# Patient Record
Sex: FEMALE | Race: WHITE | NOT HISPANIC OR LATINO
[De-identification: names, ages, dates, MRNs, and addresses within clinical notes are randomized per-mention and may not be internally consistent; named-entity substitution may affect disease eponyms.]

---

## 2018-12-06 ENCOUNTER — RESULT REVIEW (OUTPATIENT)
Age: 53
End: 2018-12-06

## 2019-01-02 ENCOUNTER — APPOINTMENT (OUTPATIENT)
Dept: PLASTIC SURGERY | Facility: CLINIC | Age: 54
End: 2019-01-02

## 2019-03-27 ENCOUNTER — APPOINTMENT (OUTPATIENT)
Dept: UROGYNECOLOGY | Facility: CLINIC | Age: 54
End: 2019-03-27
Payer: COMMERCIAL

## 2019-03-27 VITALS
HEART RATE: 68 BPM | HEIGHT: 64 IN | DIASTOLIC BLOOD PRESSURE: 76 MMHG | BODY MASS INDEX: 25.1 KG/M2 | SYSTOLIC BLOOD PRESSURE: 116 MMHG | WEIGHT: 147 LBS

## 2019-03-27 DIAGNOSIS — Z80.49 FAMILY HISTORY OF MALIGNANT NEOPLASM OF OTHER GENITAL ORGANS: ICD-10-CM

## 2019-03-27 DIAGNOSIS — K46.9 UNSPECIFIED ABDOMINAL HERNIA W/OUT OBSTRUCTION OR GANGRENE: ICD-10-CM

## 2019-03-27 DIAGNOSIS — Z78.9 OTHER SPECIFIED HEALTH STATUS: ICD-10-CM

## 2019-03-27 LAB
BILIRUB UR QL STRIP: NORMAL
CLARITY UR: CLEAR
COLLECTION METHOD: NORMAL
GLUCOSE UR-MCNC: NORMAL
HCG UR QL: 0.2 EU/DL
HGB UR QL STRIP.AUTO: NORMAL
KETONES UR-MCNC: NORMAL
LEUKOCYTE ESTERASE UR QL STRIP: NORMAL
NITRITE UR QL STRIP: NORMAL
PH UR STRIP: 6.5
PROT UR STRIP-MCNC: NORMAL
SP GR UR STRIP: 1.02

## 2019-03-27 PROCEDURE — 51701 INSERT BLADDER CATHETER: CPT

## 2019-03-27 PROCEDURE — 99244 OFF/OP CNSLTJ NEW/EST MOD 40: CPT | Mod: 25

## 2019-03-27 NOTE — PHYSICAL EXAM
[No Acute Distress] : in no acute distress [Well developed] : well developed [Well Nourished] : ~L well nourished [Oriented x3] : oriented to person, place, and time [Bulbocavernous] : bulbocavernous was present [Anal Reflex] : the anal reflex was present [Warm and Dry] : was warm and dry to touch [Normal Gait] : gait was normal [Normal Appearance] : general appearance was normal [Uterine Adnexae] : were not tender and not enlarged [Normal rectal exam] : was normal [Normal] : was normal [1] : 1 [Aa ____] : Aa [unfilled] [Ba ____] : Ba [unfilled] [C ____] : C [unfilled] [GH ____] : GH [unfilled] [PB ____] : PB [unfilled] [TVL ____] : TVL  [unfilled] [Bp ____] : Bp [unfilled] [D ____] : D [unfilled] [Rectocele] : a rectocele [Cystocele] : a cystocele [Uterine Prolapse] : uterine prolapse [] : III [Post Void Residual ____ml] : post void residual via catheterization was [unfilled] ml [Tenderness] : ~T no ~M abdominal tenderness observed [Distended] : not distended [H/Smegaly] : no hepatosplenomegaly [Inguinal LAD] : no adenopathy was noted in the inguinal lymph nodes [FreeTextEntry3] : + hypermobility based on POPQ

## 2019-03-27 NOTE — HISTORY OF PRESENT ILLNESS
[Cystocele (Obstetric)] : daily [Uterine Prolapse] : daily [Vaginal Wall Prolapse] : none [Rectal Prolapse] : daily [Urinary Frequency] : none [Feelings Of Urinary Urgency] : none [Urinary Frequency More Than Twice At Night (Nocturia)] : none [Urinary Tract Infection] : none [Constipation Obstructed Defecation] : none [Pelvic Pain] : none [Vaginal Pain] : none [Rectal Pain] : none [] : months ago [Unable To Restrain Bowel Movement] : none [de-identified] : 2-3 [FreeTextEntry3] : to help with voiding [de-identified] : 1-2 [FreeTextEntry5] : worse in the last year [de-identified] : 2-3 [de-identified] : + episode of spont urine loss 1 year ago [de-identified] : 2-3 [de-identified] : sometimes urine won't come out and needs to reduce prolapse [de-identified] : not currently sexually active (no partner) [FreeTextEntry1] : 52 yo with complaints of vaginal bulge, pressure and splinting which has worsened over the past few years. Reports these symptoms have interfered with her daily activities (is unable to play basketball with her son).  ROS as per questionnaire.\par

## 2019-03-27 NOTE — PROCEDURE
[FreeTextEntry1] : A catheterized urine was performed to rule out urinary tract infection and/or retention \par

## 2019-03-27 NOTE — DISCUSSION/SUMMARY
[FreeTextEntry1] : I reviewed the above findings with the patient. Treatment options for the prolapse were discussed and included doing nothing, Kegel exercises and behavioral modification, a pessary, or surgical correction.Surgically we discussed the abdominal vs the vaginal routes. Abdominally we discussed a hysterectomy and a sacral colpopexy either via laparotomy or laparoscopically and robotically.  Vaginally we discussed a vaginal hysterectomy, uterosacral suspension, and anterior repair versus vaginal mesh reconstruction. We discussed the FDA warning on transvaginal mesh. Surgically we discussed hysteropexy as well as the use of biologics. Pt interested in surgical correction and I have asked her to return for urodynamic testing to further evaluate her urinary complaints. We'll further discuss treatment options after her evaluation is completed. I UGA patient information on pelvic organ prolapse and sacral colpopexy was given to her. All questions were answered.\par

## 2019-03-28 ENCOUNTER — RESULT REVIEW (OUTPATIENT)
Age: 54
End: 2019-03-28

## 2019-03-28 LAB
APPEARANCE: CLEAR
BACTERIA: NEGATIVE
BILIRUBIN URINE: NEGATIVE
BLOOD URINE: NEGATIVE
COLOR: NORMAL
GLUCOSE QUALITATIVE U: NEGATIVE
HYALINE CASTS: 0 /LPF
KETONES URINE: NEGATIVE
LEUKOCYTE ESTERASE URINE: NEGATIVE
MICROSCOPIC-UA: NORMAL
NITRITE URINE: NEGATIVE
PH URINE: 6.5
PROTEIN URINE: NEGATIVE
RED BLOOD CELLS URINE: 1 /HPF
SPECIFIC GRAVITY URINE: 1.02
SQUAMOUS EPITHELIAL CELLS: 0 /HPF
UROBILINOGEN URINE: NORMAL
WHITE BLOOD CELLS URINE: 0 /HPF

## 2019-03-29 ENCOUNTER — RESULT REVIEW (OUTPATIENT)
Age: 54
End: 2019-03-29

## 2019-03-29 LAB — BACTERIA UR CULT: NORMAL

## 2019-04-26 ENCOUNTER — APPOINTMENT (OUTPATIENT)
Dept: OTOLARYNGOLOGY | Facility: CLINIC | Age: 54
End: 2019-04-26
Payer: COMMERCIAL

## 2019-04-26 VITALS
HEART RATE: 71 BPM | WEIGHT: 147 LBS | SYSTOLIC BLOOD PRESSURE: 108 MMHG | BODY MASS INDEX: 25.1 KG/M2 | DIASTOLIC BLOOD PRESSURE: 71 MMHG | HEIGHT: 64 IN

## 2019-04-26 DIAGNOSIS — Z80.1 FAMILY HISTORY OF MALIGNANT NEOPLASM OF TRACHEA, BRONCHUS AND LUNG: ICD-10-CM

## 2019-04-26 DIAGNOSIS — J34.2 DEVIATED NASAL SEPTUM: ICD-10-CM

## 2019-04-26 DIAGNOSIS — S02.2XXA FRACTURE OF NASAL BONES, INITIAL ENCOUNTER FOR CLOSED FRACTURE: ICD-10-CM

## 2019-04-26 DIAGNOSIS — Z86.19 PERSONAL HISTORY OF OTHER INFECTIOUS AND PARASITIC DISEASES: ICD-10-CM

## 2019-04-26 DIAGNOSIS — Z82.49 FAMILY HISTORY OF ISCHEMIC HEART DISEASE AND OTHER DISEASES OF THE CIRCULATORY SYSTEM: ICD-10-CM

## 2019-04-26 PROCEDURE — 31231 NASAL ENDOSCOPY DX: CPT

## 2019-04-26 PROCEDURE — 99204 OFFICE O/P NEW MOD 45 MIN: CPT | Mod: 25

## 2019-04-26 RX ORDER — LORATADINE 5 MG/5 ML
SOLUTION, ORAL ORAL
Refills: 0 | Status: ACTIVE | COMMUNITY

## 2019-04-26 NOTE — PROCEDURE
[Image(s) Captured] : image(s) captured and filed [Topical Lidocaine] : topical lidocaine [Oxymetazoline HCl] : oxymetazoline HCl [Serial Number: ___] : Serial Number: [unfilled] [Rigid Endoscope] : examined with a rigid endoscope [FreeTextEntry6] : Pre-op indication(s): Nasal fracture , deviated septum\par Post-op indication(s): same\par Verbal consent obtained from patient.\par “Anterior rhinoscopy insufficient to account for symptoms” \par Details for procedure: \par Scope #: `119\par Type of scope:    flexible fiber optic telescope  X   Rigid glass telescope \par Anesthesia and/or vasoconstriction was achieved topically by using: \par 4% Lidocaine spray   0.05% Oxymetazoline     Other ______ \par The following anatomic sites were directly examined in a sequential fashion: \par The scope was introduced in the nasal passage between the middle and inferior turbinates to exam the inferior portion of the middle meatus and the fontanelle, as well as the maxillary ostia. Next, the scope was passed medially and posteriorly to the middle turbinates to examine the sphenoethmoid recess and the superior turbinate region. \par Upon visualization the finders are as follows: \par Nasal Septum:     Deviated to    right  \par Bleeding site cauterized:    Anterior   left   right   Posterior   left   right \par Method:   Silver Nitrate   YAG Laser    Electrocautery ______ \par Right Side: \par * Mucosa: Normal\par * Mucous: Normal\par * Polyp: Normal\par * Inferior Turbinate: Normal\par * Middle Turbinate: Normal\par * Superior Turbinate: Normal\par * Inferior Meatus: Normal\par * Middle Meatus: Normal\par * Super Meatus: Normal\par * Sphenoethmoidal Recess: Normal\par Left Side: \par * Mucosa: Normal\par * Mucous: Normal\par * Polyp: Normal\par * Inferior Turbinate: Normal\par * Middle Turbinate: Normal\par * Superior Turbinate: Normal\par * Inferior Meatus: Normal\par * Middle Meatus: Normal\par * Super Meatus: Normal\par * Sphenoethmoidal Recess: Normal\par The patient tolerated the procedure well without any complications.\par \par \par

## 2019-04-26 NOTE — HISTORY OF PRESENT ILLNESS
[de-identified] : 53 year old female here for fractured nose.  STates fractured nose from a fall 12/19/18.  States xray showed fracture.  States having difficulty breathing through the right side of the nose.   Closed reduction done in [place] by Jared Washington MD

## 2019-04-26 NOTE — PHYSICAL EXAM
[Nasal Endoscopy Performed] : nasal endoscopy was performed, see procedure section for findings [] : septum deviated to the right [Midline] : trachea located in midline position [Normal] : no rashes [de-identified] : Depressed left nasal fractire [de-identified] : swollen

## 2019-04-26 NOTE — REASON FOR VISIT
[Initial Evaluation] : an initial evaluation for [Other: _____] : [unfilled] [FreeTextEntry2] : here for fractured nose

## 2019-06-07 ENCOUNTER — APPOINTMENT (OUTPATIENT)
Dept: OTOLARYNGOLOGY | Facility: CLINIC | Age: 54
End: 2019-06-07

## 2019-06-17 ENCOUNTER — APPOINTMENT (OUTPATIENT)
Dept: OTOLARYNGOLOGY | Facility: CLINIC | Age: 54
End: 2019-06-17

## 2019-06-18 ENCOUNTER — APPOINTMENT (OUTPATIENT)
Dept: OTOLARYNGOLOGY | Facility: CLINIC | Age: 54
End: 2019-06-18

## 2019-11-20 ENCOUNTER — APPOINTMENT (OUTPATIENT)
Dept: OTOLARYNGOLOGY | Facility: CLINIC | Age: 54
End: 2019-11-20

## 2019-11-26 ENCOUNTER — APPOINTMENT (OUTPATIENT)
Dept: OTOLARYNGOLOGY | Facility: HOSPITAL | Age: 54
End: 2019-11-26

## 2019-11-27 ENCOUNTER — APPOINTMENT (OUTPATIENT)
Dept: OTOLARYNGOLOGY | Facility: CLINIC | Age: 54
End: 2019-11-27

## 2020-01-03 ENCOUNTER — APPOINTMENT (OUTPATIENT)
Dept: UROGYNECOLOGY | Facility: CLINIC | Age: 55
End: 2020-01-03
Payer: COMMERCIAL

## 2020-01-03 ENCOUNTER — RESULT CHARGE (OUTPATIENT)
Age: 55
End: 2020-01-03

## 2020-01-03 ENCOUNTER — OUTPATIENT (OUTPATIENT)
Dept: OUTPATIENT SERVICES | Facility: HOSPITAL | Age: 55
LOS: 1 days | End: 2020-01-03
Payer: COMMERCIAL

## 2020-01-03 DIAGNOSIS — R35.0 FREQUENCY OF MICTURITION: ICD-10-CM

## 2020-01-03 DIAGNOSIS — Z01.818 ENCOUNTER FOR OTHER PREPROCEDURAL EXAMINATION: ICD-10-CM

## 2020-01-03 LAB
BILIRUB UR QL STRIP: NORMAL
CLARITY UR: CLEAR
COLLECTION METHOD: NORMAL
GLUCOSE UR-MCNC: NORMAL
HCG UR QL: 0.2 EU/DL
HGB UR QL STRIP.AUTO: NORMAL
KETONES UR-MCNC: NORMAL
LEUKOCYTE ESTERASE UR QL STRIP: NORMAL
NITRITE UR QL STRIP: NORMAL
PH UR STRIP: 6
PROT UR STRIP-MCNC: NORMAL
SP GR UR STRIP: 1

## 2020-01-03 PROCEDURE — 51784 ANAL/URINARY MUSCLE STUDY: CPT | Mod: 26

## 2020-01-03 PROCEDURE — 51729 CYSTOMETROGRAM W/VP&UP: CPT | Mod: 26

## 2020-01-03 PROCEDURE — 51784 ANAL/URINARY MUSCLE STUDY: CPT

## 2020-01-03 PROCEDURE — 51797 INTRAABDOMINAL PRESSURE TEST: CPT | Mod: 26

## 2020-01-03 PROCEDURE — 51729 CYSTOMETROGRAM W/VP&UP: CPT

## 2020-01-03 PROCEDURE — 51797 INTRAABDOMINAL PRESSURE TEST: CPT

## 2020-01-27 ENCOUNTER — APPOINTMENT (OUTPATIENT)
Dept: UROGYNECOLOGY | Facility: CLINIC | Age: 55
End: 2020-01-27
Payer: COMMERCIAL

## 2020-01-27 ENCOUNTER — APPOINTMENT (OUTPATIENT)
Dept: UROGYNECOLOGY | Facility: CLINIC | Age: 55
End: 2020-01-27

## 2020-01-27 DIAGNOSIS — N36.41 HYPERMOBILITY OF URETHRA: ICD-10-CM

## 2020-01-27 PROCEDURE — 99214 OFFICE O/P EST MOD 30 MIN: CPT

## 2020-01-27 NOTE — DISCUSSION/SUMMARY
[FreeTextEntry1] : I reviewed the above findings with the patient with visual illustrations. Treatment options for the prolapse were discussed and included doing nothing, Kegel exercises and behavioral modification, a pessary, or surgical correction.Surgically we discussed the abdominal vs the vaginal routes. Abdominally we discussed a hysterectomy and a sacral colpopexy   laparoscopically and robotically.  Vaginally we discussed a vaginal hysterectomy, uterosacral suspension, and anterior/posterior repair. Risks and benefits discussed.She wishes to proceed with the lap/oleg route with hysterectomy and sacral colpopexy.  We discussed that urodynamic testing did not show DORETHA and the possibility of developing DORETHA postop.  We discussed risks and benefits of sling at time of surgery and we will not proceed with such. We discussed that post-op if she develops DORETHA she can request tx as well as surgical management.  We discussed the possibly of going home with a catheter.  All questions were answered. IUGA pt info on SCP given\par \par

## 2020-01-27 NOTE — PHYSICAL EXAM
[Aa ____] : Aa [unfilled] [Ba ____] : Ba [unfilled] [C ____] : C [unfilled] [GH ____] : GH [unfilled] [TVL ____] : TVL  [unfilled] [Ap ____] : Ap [unfilled] [Bp ____] : Bp [unfilled] [D ____] : D [unfilled] [Uterine Adnexae] : were not tender and not enlarged [No Acute Distress] : in no acute distress [Well developed] : well developed [Well Nourished] : ~L well nourished [Oriented x3] : oriented to person, place, and time [Soft, Nontender] : the abdomen was soft and nontender [Warm and Dry] : was warm and dry to touch [Normal] : normal external genitalia [Rectocele] : a rectocele [Cystocele] : a cystocele [Uterine Prolapse] : uterine prolapse [FreeTextEntry3] : + hypoermobility

## 2020-01-27 NOTE — HISTORY OF PRESENT ILLNESS
[Uterine Prolapse] : daily [Vaginal Wall Prolapse] : none [Rectal Prolapse] : none [FreeTextEntry1] : pt here for f/u on POP she is interested in surgical correction.

## 2020-03-13 ENCOUNTER — APPOINTMENT (OUTPATIENT)
Dept: OTOLARYNGOLOGY | Facility: CLINIC | Age: 55
End: 2020-03-13

## 2020-04-07 ENCOUNTER — APPOINTMENT (OUTPATIENT)
Dept: OTOLARYNGOLOGY | Facility: HOSPITAL | Age: 55
End: 2020-04-07

## 2020-04-08 ENCOUNTER — APPOINTMENT (OUTPATIENT)
Dept: OTOLARYNGOLOGY | Facility: CLINIC | Age: 55
End: 2020-04-08

## 2020-06-03 ENCOUNTER — APPOINTMENT (OUTPATIENT)
Dept: UROGYNECOLOGY | Facility: CLINIC | Age: 55
End: 2020-06-03

## 2020-10-07 ENCOUNTER — RESULT REVIEW (OUTPATIENT)
Age: 55
End: 2020-10-07

## 2020-11-23 ENCOUNTER — APPOINTMENT (OUTPATIENT)
Dept: UROGYNECOLOGY | Facility: CLINIC | Age: 55
End: 2020-11-23
Payer: COMMERCIAL

## 2020-11-23 VITALS
TEMPERATURE: 97.1 F | SYSTOLIC BLOOD PRESSURE: 112 MMHG | BODY MASS INDEX: 25.61 KG/M2 | WEIGHT: 150 LBS | HEIGHT: 64 IN | DIASTOLIC BLOOD PRESSURE: 78 MMHG

## 2020-11-23 PROCEDURE — 99214 OFFICE O/P EST MOD 30 MIN: CPT

## 2020-11-23 PROCEDURE — 81003 URINALYSIS AUTO W/O SCOPE: CPT | Mod: QW

## 2020-11-23 NOTE — HISTORY OF PRESENT ILLNESS
[Uterine Prolapse] : severe [Vaginal Wall Prolapse] : no [] : years ago [Rectal Prolapse] : no [Unable To Restrain Bowel Movement] : no [Feelings Of Urinary Urgency] : no [Urinary Tract Infection] : no [Constipation Obstructed Defecation] : no [de-identified] : 3 [de-identified] : 2 [de-identified] : 3 [FreeTextEntry1] : Virginia is a 55 yo with stage prolapse presenting for follow up.  Patient has had no change in symptoms since her initial visit and continues to desire surgery.  Her preoperative work up is as follows:\par \par TVUS 10/22/20: uterus 4.3 x 1.9 x. 3.1 cm, EM 0.1cm, left ovary 1.7 x 1.1 x 1.3, right ovary not visualized. \par Urodynamics 20:  no leak at any volume, no DO, PVR 0cc\par \par PMH: , Lyme disease with recent deer tick bite folllowing up with her PCP to discuss with PCP at presurgical clearance, periumbilical hernia\par PSH: None

## 2020-11-23 NOTE — PHYSICAL EXAM
[Chaperone Present] : A chaperone was present in the examining room during all aspects of the physical examination [No Acute Distress] : in no acute distress [Well developed] : well developed [Well Nourished] : ~L well nourished [Oriented x3] : oriented to person, place, and time [Soft, Nontender] : the abdomen was soft and nontender [Warm and Dry] : was warm and dry to touch [Rectocele] : a rectocele [Cystocele] : a cystocele [Uterine Prolapse] : uterine prolapse [Aa ____] : Aa [unfilled] [Ba ____] : Ba [unfilled] [C ____] : C [unfilled] [GH ____] : GH [unfilled] [TVL ____] : TVL  [unfilled] [Ap ____] : Ap [unfilled] [Bp ____] : Bp [unfilled] [D ____] : D [unfilled] [Normal] : normal [Uterine Adnexae] : were not tender and not enlarged [PB ____] : PB [unfilled] [FreeTextEntry3] : + hypoermobility

## 2020-11-23 NOTE — DISCUSSION/SUMMARY
[FreeTextEntry1] : Virginia is a 55 yo with stage III POP presenting for presurgical counseling. I reviewed the above findings with the patient as well surgical and nonsurgical treatment options for the prolapse  and stress incontinence and she wishes to proceed with surgical correction via laparoscopic/robotic hysterectomy and sacral colpopexy for the prolapse . Risks and benefits of a supracervical hysterectomy versus total were discussed and she wishes to proceed with a supracervical hysterectomy. Risks and benefits of a BSO were discussed and she wishes to proceed with a bilateral salpingectomy, and oophorectomy only if ovaries are found to be abnormal at the time of surgery. We discussed the possibility of a laparotomy at the time of surgical correction. We discussed the results of her urodynamic testing and risk of potential undetected stress urinary incontinence, given no occult DORETHA found patient declines sling however understands there is still some risk of new DORETHA or OAB following surgery.  Risks and benefits of the procedure were discussed and included but not limited to bleeding, infection, failure, erosion, dyspareunia, damage to other organs including bladder bowel and ureters, developing chronic pelvic pain, and urinary retention. We discussed the possibility of going home with a catheter. Hospital stay, postoperative restrictions, and anesthesia were discussed. All questions were answered and she wishes to proceed with surgical correction. Consent was signed in the office.\par

## 2020-12-01 ENCOUNTER — OUTPATIENT (OUTPATIENT)
Dept: OUTPATIENT SERVICES | Facility: HOSPITAL | Age: 55
LOS: 1 days | End: 2020-12-01
Payer: COMMERCIAL

## 2020-12-01 VITALS
OXYGEN SATURATION: 98 % | SYSTOLIC BLOOD PRESSURE: 110 MMHG | HEIGHT: 64.3 IN | DIASTOLIC BLOOD PRESSURE: 70 MMHG | HEART RATE: 77 BPM | TEMPERATURE: 98 F | RESPIRATION RATE: 16 BRPM | WEIGHT: 149.91 LBS

## 2020-12-01 DIAGNOSIS — N81.11 CYSTOCELE, MIDLINE: ICD-10-CM

## 2020-12-01 DIAGNOSIS — Z01.818 ENCOUNTER FOR OTHER PREPROCEDURAL EXAMINATION: ICD-10-CM

## 2020-12-01 DIAGNOSIS — Z90.89 ACQUIRED ABSENCE OF OTHER ORGANS: Chronic | ICD-10-CM

## 2020-12-01 DIAGNOSIS — Z98.890 OTHER SPECIFIED POSTPROCEDURAL STATES: Chronic | ICD-10-CM

## 2020-12-01 DIAGNOSIS — Z29.9 ENCOUNTER FOR PROPHYLACTIC MEASURES, UNSPECIFIED: ICD-10-CM

## 2020-12-01 DIAGNOSIS — N81.4 UTEROVAGINAL PROLAPSE, UNSPECIFIED: ICD-10-CM

## 2020-12-01 DIAGNOSIS — N81.3 COMPLETE UTEROVAGINAL PROLAPSE: ICD-10-CM

## 2020-12-01 LAB
A1C WITH ESTIMATED AVERAGE GLUCOSE RESULT: 5.4 % — SIGNIFICANT CHANGE UP (ref 4–5.6)
ANION GAP SERPL CALC-SCNC: 11 MMOL/L — SIGNIFICANT CHANGE UP (ref 5–17)
BLD GP AB SCN SERPL QL: NEGATIVE — SIGNIFICANT CHANGE UP
BUN SERPL-MCNC: 16 MG/DL — SIGNIFICANT CHANGE UP (ref 7–23)
CALCIUM SERPL-MCNC: 9.2 MG/DL — SIGNIFICANT CHANGE UP (ref 8.4–10.5)
CHLORIDE SERPL-SCNC: 101 MMOL/L — SIGNIFICANT CHANGE UP (ref 96–108)
CO2 SERPL-SCNC: 24 MMOL/L — SIGNIFICANT CHANGE UP (ref 22–31)
CREAT SERPL-MCNC: 0.49 MG/DL — LOW (ref 0.5–1.3)
ESTIMATED AVERAGE GLUCOSE: 108 MG/DL — SIGNIFICANT CHANGE UP (ref 68–114)
GLUCOSE SERPL-MCNC: 94 MG/DL — SIGNIFICANT CHANGE UP (ref 70–99)
POTASSIUM SERPL-MCNC: 3.9 MMOL/L — SIGNIFICANT CHANGE UP (ref 3.5–5.3)
POTASSIUM SERPL-SCNC: 3.9 MMOL/L — SIGNIFICANT CHANGE UP (ref 3.5–5.3)
RH IG SCN BLD-IMP: POSITIVE — SIGNIFICANT CHANGE UP
SODIUM SERPL-SCNC: 136 MMOL/L — SIGNIFICANT CHANGE UP (ref 135–145)

## 2020-12-01 PROCEDURE — 86850 RBC ANTIBODY SCREEN: CPT

## 2020-12-01 PROCEDURE — 80048 BASIC METABOLIC PNL TOTAL CA: CPT

## 2020-12-01 PROCEDURE — 86900 BLOOD TYPING SEROLOGIC ABO: CPT

## 2020-12-01 PROCEDURE — 83036 HEMOGLOBIN GLYCOSYLATED A1C: CPT

## 2020-12-01 PROCEDURE — G0463: CPT

## 2020-12-01 PROCEDURE — 86901 BLOOD TYPING SEROLOGIC RH(D): CPT

## 2020-12-01 PROCEDURE — 85027 COMPLETE CBC AUTOMATED: CPT

## 2020-12-01 RX ORDER — SODIUM CHLORIDE 9 MG/ML
3 INJECTION INTRAMUSCULAR; INTRAVENOUS; SUBCUTANEOUS EVERY 8 HOURS
Refills: 0 | Status: DISCONTINUED | OUTPATIENT
Start: 2020-12-15 | End: 2020-12-15

## 2020-12-01 RX ORDER — CEFOTETAN DISODIUM 1 G
2 VIAL (EA) INJECTION ONCE
Refills: 0 | Status: COMPLETED | OUTPATIENT
Start: 2020-12-15 | End: 2020-12-15

## 2020-12-01 NOTE — H&P PST ADULT - NSICDXPROBLEM_GEN_ALL_CORE_FT
PROBLEM DIAGNOSES  Problem: Uterovaginal prolapse  Assessment and Plan: cystoscopy, laparoscopic robotic supracervical hysterectomy, laparoscopic robotic sacral colpopexy on 12/15/2020.   PST instructions provided, surgical scrub given, patient verbalized understanding.   CBc,BMP,HgA1c, T&S collected and sent   Urine cx 11/23 @ [facility]- negative   [facility]  [facility]CG, FS on admission        PROBLEM DIAGNOSES  Problem: Uterovaginal prolapse  Assessment and Plan: cystoscopy, laparoscopic robotic supracervical hysterectomy, laparoscopic robotic sacral colpopexy on 12/15/2020.   PST instructions provided, surgical scrub given, patient verbalized understanding.   CBc,BMP,HgA1c, T&S collected and sent   Urine cx 11/23 @ [facility]- negative   Covid PCR 12/12/20 @ [facility]  [facility], FS on admission     Problem: Need for prophylactic measure  Assessment and Plan: The Caprini score indicates this patient is at risk for a VTE event (score 3-5).  Most surgical patients in this group would benefit from pharmacologic prophylaxis.  The surgical team will determine the balance between VTE risk and bleeding risk

## 2020-12-01 NOTE — H&P PST ADULT - ASSESSMENT
KIMBERLYI VTE 2.0 SCORE [CLOT updated 2019]    AGE RELATED RISK FACTORS                                                       MOBILITY RELATED FACTORS  [x ] Age 41-60 years                                            (1 Point)                    [ ] Bed rest                                                        (1 Point)  [ ] Age: 61-74 years                                           (2 Points)                  [ ] Plaster cast                                                   (2 Points)  [ ] Age= 75 years                                              (3 Points)                    [ ] Bed bound for more than 72 hours                 (2 Points)    DISEASE RELATED RISK FACTORS                                               GENDER SPECIFIC FACTORS  [ ] Edema in the lower extremities                       (1 Point)              [ ] Pregnancy                                                     (1 Point)  [ ] Varicose veins                                               (1 Point)                     [ ] Post-partum < 6 weeks                                   (1 Point)             [x ] BMI > 25 Kg/m2                                            (1 Point)                     [ ] Hormonal therapy  or oral contraception          (1 Point)                 [ ] Sepsis (in the previous month)                        (1 Point)               [ ] History of pregnancy complications                 (1 point)  [ ] Pneumonia or serious lung disease                                               [ ] Unexplained or recurrent                     (1 Point)           (in the previous month)                               (1 Point)  [ ] Abnormal pulmonary function test                     (1 Point)                 SURGERY RELATED RISK FACTORS  [ ] Acute myocardial infarction                              (1 Point)               [ ]  Section                                             (1 Point)  [ ] Congestive heart failure (in the previous month)  (1 Point)      [ ] Minor surgery                                                  (1 Point)   [ ] Inflammatory bowel disease                             (1 Point)               [ ] Arthroscopic surgery                                        (2 Points)  [ ] Central venous access                                      (2 Points)                [x] General surgery lasting more than 45 minutes (2 points)  [ ] Malignancy- Present or previous                   (2 Points)                [ ] Elective arthroplasty                                         (5 points)    [ ] Stroke (in the previous month)                          (5 Points)                                                                                                                                                           HEMATOLOGY RELATED FACTORS                                                 TRAUMA RELATED RISK FACTORS  [ ] Prior episodes of VTE                                     (3 Points)                [ ] Fracture of the hip, pelvis, or leg                       (5 Points)  [ ] Positive family history for VTE                         (3 Points)             [ ] Acute spinal cord injury (in the previous month)  (5 Points)  [ ] Prothrombin 24795 A                                     (3 Points)               [ ] Paralysis  (less than 1 month)                             (5 Points)  [ ] Factor V Leiden                                             (3 Points)                  [ ] Multiple Trauma within 1 month                        (5 Points)  [ ] Lupus anticoagulants                                     (3 Points)                                                           [ ] Anticardiolipin antibodies                               (3 Points)                                                       [ ] High homocysteine in the blood                      (3 Points)                                             [ ] Other congenital or acquired thrombophilia      (3 Points)                                                [ ] Heparin induced thrombocytopenia                  (3 Points)                                     Total Score [    4      ] KIMBERLYI VTE 2.0 SCORE [CLOT updated 2019]    AGE RELATED RISK FACTORS                                                       MOBILITY RELATED FACTORS  [x ] Age 41-60 years                                            (1 Point)                    [ ] Bed rest                                                        (1 Point)  [ ] Age: 61-74 years                                           (2 Points)                  [ ] Plaster cast                                                   (2 Points)  [ ] Age= 75 years                                              (3 Points)                    [ ] Bed bound for more than 72 hours                 (2 Points)    DISEASE RELATED RISK FACTORS                                               GENDER SPECIFIC FACTORS  [ ] Edema in the lower extremities                       (1 Point)              [ ] Pregnancy                                                     (1 Point)  [ x] Varicose veins                                               (1 Point)                     [ ] Post-partum < 6 weeks                                   (1 Point)             [x ] BMI > 25 Kg/m2                                            (1 Point)                     [ ] Hormonal therapy  or oral contraception          (1 Point)                 [ ] Sepsis (in the previous month)                        (1 Point)               [ ] History of pregnancy complications                 (1 point)  [ ] Pneumonia or serious lung disease                                               [ ] Unexplained or recurrent                     (1 Point)           (in the previous month)                               (1 Point)  [ ] Abnormal pulmonary function test                     (1 Point)                 SURGERY RELATED RISK FACTORS  [ ] Acute myocardial infarction                              (1 Point)               [ ]  Section                                             (1 Point)  [ ] Congestive heart failure (in the previous month)  (1 Point)      [ ] Minor surgery                                                  (1 Point)   [ ] Inflammatory bowel disease                             (1 Point)               [ ] Arthroscopic surgery                                        (2 Points)  [ ] Central venous access                                      (2 Points)                [x] General surgery lasting more than 45 minutes (2 points)  [ ] Malignancy- Present or previous                   (2 Points)                [ ] Elective arthroplasty                                         (5 points)    [ ] Stroke (in the previous month)                          (5 Points)                                                                                                                                                           HEMATOLOGY RELATED FACTORS                                                 TRAUMA RELATED RISK FACTORS  [ ] Prior episodes of VTE                                     (3 Points)                [ ] Fracture of the hip, pelvis, or leg                       (5 Points)  [ ] Positive family history for VTE                         (3 Points)             [ ] Acute spinal cord injury (in the previous month)  (5 Points)  [ ] Prothrombin 35645 A                                     (3 Points)               [ ] Paralysis  (less than 1 month)                             (5 Points)  [ ] Factor V Leiden                                             (3 Points)                  [ ] Multiple Trauma within 1 month                        (5 Points)  [ ] Lupus anticoagulants                                     (3 Points)                                                           [ ] Anticardiolipin antibodies                               (3 Points)                                                       [ ] High homocysteine in the blood                      (3 Points)                                             [ ] Other congenital or acquired thrombophilia      (3 Points)                                                [ ] Heparin induced thrombocytopenia                  (3 Points)                                     Total Score [    5      ]

## 2020-12-01 NOTE — H&P PST ADULT - HISTORY OF PRESENT ILLNESS
54 yr old female with uterovaginal prolapse, midline cystocele, presents to PST for scheduled cystoscopy, laparoscopic robotic supracervical hysterectomy, laparoscopic robotic sacral colpopexy on 12/15/2020. Denies fever, chills, no acute complaints. Denies sick contacts.   Covid PCr 12/12 @ [facility] .

## 2020-12-01 NOTE — H&P PST ADULT - NSICDXPASTMEDICALHX_GEN_ALL_CORE_FT
PAST MEDICAL HISTORY:  Complete uterovaginal prolapse     Cystocele, midline     History of abdominal hernia mild    History of anxiety     History of Lyme disease     History of reduction of nasal fracture closed 2018     PAST MEDICAL HISTORY:  Cystocele, midline     History of abdominal hernia mild    History of anxiety     History of Lyme disease     History of reduction of nasal fracture closed 2018    History of varicose veins     Tick bite -recent bite , unclear if infected, started preventative doxycycline 2 weeks ago  ( about 11/15 /2020 ) .    Uterovaginal prolapse
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
## 2020-12-09 PROBLEM — Z86.79 PERSONAL HISTORY OF OTHER DISEASES OF THE CIRCULATORY SYSTEM: Chronic | Status: ACTIVE | Noted: 2020-12-01

## 2020-12-09 PROBLEM — Z98.890 OTHER SPECIFIED POSTPROCEDURAL STATES: Chronic | Status: ACTIVE | Noted: 2020-12-01

## 2020-12-09 PROBLEM — Z86.19 PERSONAL HISTORY OF OTHER INFECTIOUS AND PARASITIC DISEASES: Chronic | Status: ACTIVE | Noted: 2020-12-01

## 2020-12-09 PROBLEM — N81.4 UTEROVAGINAL PROLAPSE, UNSPECIFIED: Chronic | Status: ACTIVE | Noted: 2020-12-01

## 2020-12-09 PROBLEM — Z87.19 PERSONAL HISTORY OF OTHER DISEASES OF THE DIGESTIVE SYSTEM: Chronic | Status: ACTIVE | Noted: 2020-12-01

## 2020-12-09 PROBLEM — Z86.59 PERSONAL HISTORY OF OTHER MENTAL AND BEHAVIORAL DISORDERS: Chronic | Status: ACTIVE | Noted: 2020-12-01

## 2020-12-09 PROBLEM — N81.11 CYSTOCELE, MIDLINE: Chronic | Status: ACTIVE | Noted: 2020-12-01

## 2020-12-12 ENCOUNTER — OUTPATIENT (OUTPATIENT)
Dept: OUTPATIENT SERVICES | Facility: HOSPITAL | Age: 55
LOS: 1 days | End: 2020-12-12
Payer: COMMERCIAL

## 2020-12-12 DIAGNOSIS — Z98.890 OTHER SPECIFIED POSTPROCEDURAL STATES: Chronic | ICD-10-CM

## 2020-12-12 DIAGNOSIS — Z90.89 ACQUIRED ABSENCE OF OTHER ORGANS: Chronic | ICD-10-CM

## 2020-12-12 DIAGNOSIS — Z11.59 ENCOUNTER FOR SCREENING FOR OTHER VIRAL DISEASES: ICD-10-CM

## 2020-12-12 LAB — SARS-COV-2 RNA SPEC QL NAA+PROBE: SIGNIFICANT CHANGE UP

## 2020-12-12 PROCEDURE — U0003: CPT

## 2020-12-14 ENCOUNTER — TRANSCRIPTION ENCOUNTER (OUTPATIENT)
Age: 55
End: 2020-12-14

## 2020-12-14 ENCOUNTER — NON-APPOINTMENT (OUTPATIENT)
Age: 55
End: 2020-12-14

## 2020-12-15 ENCOUNTER — INPATIENT (INPATIENT)
Facility: HOSPITAL | Age: 55
LOS: 0 days | Discharge: HOME | DRG: 743 | End: 2020-12-16
Attending: UROLOGY | Admitting: UROLOGY
Payer: COMMERCIAL

## 2020-12-15 ENCOUNTER — APPOINTMENT (OUTPATIENT)
Dept: UROGYNECOLOGY | Facility: HOSPITAL | Age: 55
End: 2020-12-15
Payer: COMMERCIAL

## 2020-12-15 VITALS
OXYGEN SATURATION: 100 % | RESPIRATION RATE: 16 BRPM | DIASTOLIC BLOOD PRESSURE: 74 MMHG | HEIGHT: 64 IN | SYSTOLIC BLOOD PRESSURE: 129 MMHG | TEMPERATURE: 98 F | HEART RATE: 84 BPM | WEIGHT: 149.91 LBS

## 2020-12-15 DIAGNOSIS — Z98.890 OTHER SPECIFIED POSTPROCEDURAL STATES: Chronic | ICD-10-CM

## 2020-12-15 DIAGNOSIS — N81.11 CYSTOCELE, MIDLINE: ICD-10-CM

## 2020-12-15 DIAGNOSIS — Z90.89 ACQUIRED ABSENCE OF OTHER ORGANS: Chronic | ICD-10-CM

## 2020-12-15 DIAGNOSIS — N81.3 COMPLETE UTEROVAGINAL PROLAPSE: ICD-10-CM

## 2020-12-15 LAB
GLUCOSE BLDC GLUCOMTR-MCNC: 97 MG/DL — SIGNIFICANT CHANGE UP (ref 70–99)
HCG UR QL: NEGATIVE — SIGNIFICANT CHANGE UP
RH IG SCN BLD-IMP: POSITIVE — SIGNIFICANT CHANGE UP

## 2020-12-15 PROCEDURE — 58542 LSH W/T/O UT 250 G OR LESS: CPT

## 2020-12-15 PROCEDURE — 57425 LAPAROSCOPY SURG COLPOPEXY: CPT

## 2020-12-15 PROCEDURE — 88305 TISSUE EXAM BY PATHOLOGIST: CPT | Mod: 26

## 2020-12-15 RX ORDER — ONDANSETRON 8 MG/1
8 TABLET, FILM COATED ORAL EVERY 8 HOURS
Refills: 0 | Status: DISCONTINUED | OUTPATIENT
Start: 2020-12-15 | End: 2020-12-16

## 2020-12-15 RX ORDER — HYDROMORPHONE HYDROCHLORIDE 2 MG/ML
0.5 INJECTION INTRAMUSCULAR; INTRAVENOUS; SUBCUTANEOUS
Refills: 0 | Status: DISCONTINUED | OUTPATIENT
Start: 2020-12-15 | End: 2020-12-15

## 2020-12-15 RX ORDER — ONDANSETRON 8 MG/1
4 TABLET, FILM COATED ORAL ONCE
Refills: 0 | Status: DISCONTINUED | OUTPATIENT
Start: 2020-12-15 | End: 2020-12-15

## 2020-12-15 RX ORDER — KETOROLAC TROMETHAMINE 30 MG/ML
30 SYRINGE (ML) INJECTION EVERY 6 HOURS
Refills: 0 | Status: DISCONTINUED | OUTPATIENT
Start: 2020-12-15 | End: 2020-12-16

## 2020-12-15 RX ORDER — SODIUM CHLORIDE 9 MG/ML
1000 INJECTION, SOLUTION INTRAVENOUS
Refills: 0 | Status: DISCONTINUED | OUTPATIENT
Start: 2020-12-15 | End: 2020-12-15

## 2020-12-15 RX ORDER — SODIUM CHLORIDE 9 MG/ML
1000 INJECTION, SOLUTION INTRAVENOUS
Refills: 0 | Status: DISCONTINUED | OUTPATIENT
Start: 2020-12-15 | End: 2020-12-16

## 2020-12-15 RX ORDER — SIMETHICONE 80 MG/1
80 TABLET, CHEWABLE ORAL EVERY 6 HOURS
Refills: 0 | Status: DISCONTINUED | OUTPATIENT
Start: 2020-12-15 | End: 2020-12-16

## 2020-12-15 RX ORDER — OXYCODONE HYDROCHLORIDE 5 MG/1
5 TABLET ORAL EVERY 4 HOURS
Refills: 0 | Status: DISCONTINUED | OUTPATIENT
Start: 2020-12-15 | End: 2020-12-16

## 2020-12-15 RX ORDER — ACETAMINOPHEN 500 MG
975 TABLET ORAL EVERY 6 HOURS
Refills: 0 | Status: DISCONTINUED | OUTPATIENT
Start: 2020-12-15 | End: 2020-12-16

## 2020-12-15 RX ADMIN — Medication 975 MILLIGRAM(S): at 22:24

## 2020-12-15 RX ADMIN — SODIUM CHLORIDE 75 MILLILITER(S): 9 INJECTION, SOLUTION INTRAVENOUS at 18:32

## 2020-12-15 RX ADMIN — Medication 975 MILLIGRAM(S): at 22:54

## 2020-12-15 NOTE — CHART NOTE - NSCHARTNOTEFT_GEN_A_CORE
R2 GYN POST-OP CHECK    SUBJECTIVE:  56yo female s/p robot assisted supracervical hysterectomy, bilateral salpingectomy, sacrocolpopexy and cystoscopy seen and evaluated at bedside.  Patient found awake and alert, resting comfortably in bed.  She reports that her pain is well controlled with analgesia. Denies N/V, SOB, CP, palpitations, fever/chills. Tolerating clears.  She has not yet ambulated.     OBJECTIVE:  T(C): 36.5 (12-15-20 @ 20:00), Max: 36.5 (12-15-20 @ 20:00)  HR: 83 (12-15-20 @ 20:00) (70 - 83)  BP: 131/64 (12-15-20 @ 20:00) (117/74 - 134/65)  RR: 16 (12-15-20 @ 20:00) (16 - 16)  SpO2: 100% (12-15-20 @ 20:00) (100% - 100%)      I&O's Summary  15 Dec 2020 07:01  -  15 Dec 2020 21:13  --------------------------------------------------------  IN: 300 mL / OUT: 450 mL / NET: -150 mL      Gen: Resting comfortably in bed, NAD  CV: RRR  Lungs: CTAB  Abd: Soft, appropriately tender, non-distended  Inc: Clean/dry/intact.   Ext: SCD's in place and functional, non-tender bilaterally, no edema    ASSESSMENT:  56yo female s/p robot assisted supracervical hysterectomy, bilateral salpingectomy, sacrocopoplexy and cystoscopy seen and evaluated at bedside. Patient with stable vitals and reassuring clinical status at this time. Meeting all appropriate postopeartive milestones.     PLAN:    1. Neuro: Tylenol, Toradol, Oxycodone PRN for pain.     2. CV: Hemodynamically stable. CBC in AM.     3. Pulm: Saturating well on room air.  Encourage OOB and incentive spirometer use. Reviewed use of incentive spirometer with patient.     4. GI: Advance to regular diet. Anti-emetics PRN.    5. : Gilbert to gravity. Anticipate active TOV in AM.     6. Electrolytes: LR@75cc/hr.     7. DVT ppx w/ SCD's while in bed. Early ambulation, initially with assistance then as tolerated.      8. Discharge from PACU when criteria met.     Denise Llamas PGY-2

## 2020-12-15 NOTE — BRIEF OPERATIVE NOTE - NSICDXBRIEFPOSTOP_GEN_ALL_CORE_FT
POST-OP DIAGNOSIS:  Female cystocele 15-Dec-2020 17:53:22  Tate Marte  Complete uterovaginal prolapse 15-Dec-2020 17:53:13  Tate Marte

## 2020-12-15 NOTE — BRIEF OPERATIVE NOTE - OPERATION/FINDINGS
Uterus approximately 6cm in size with grossly normal appearing bilateral fallopian tubes and ovaries. Cystoscopy with normal bilateral UO jets, no tumor, suture or foreign body within the bladder.

## 2020-12-15 NOTE — BRIEF OPERATIVE NOTE - NSICDXBRIEFPROCEDURE_GEN_ALL_CORE_FT
PROCEDURES:  Robot-assisted bilateral salpingectomy 15-Dec-2020 17:52:58  Tate Marte  Hysterectomy, supracervical, robot-assisted, with sacrocolpopexy 15-Dec-2020 17:52:38  Tate Marte

## 2020-12-15 NOTE — BRIEF OPERATIVE NOTE - NSICDXBRIEFPREOP_GEN_ALL_CORE_FT
PRE-OP DIAGNOSIS:  Complete uterovaginal prolapse 15-Dec-2020 17:53:45  Tate Marte  Female cystocele 15-Dec-2020 17:53:35  Tate Marte

## 2020-12-16 ENCOUNTER — TRANSCRIPTION ENCOUNTER (OUTPATIENT)
Age: 55
End: 2020-12-16

## 2020-12-16 VITALS
OXYGEN SATURATION: 100 % | HEART RATE: 92 BPM | TEMPERATURE: 98 F | SYSTOLIC BLOOD PRESSURE: 122 MMHG | RESPIRATION RATE: 18 BRPM | DIASTOLIC BLOOD PRESSURE: 78 MMHG

## 2020-12-16 LAB
HCT VFR BLD CALC: 32.5 % — LOW (ref 34.5–45)
HGB BLD-MCNC: 10.7 G/DL — LOW (ref 11.5–15.5)
MCHC RBC-ENTMCNC: 30.9 PG — SIGNIFICANT CHANGE UP (ref 27–34)
MCHC RBC-ENTMCNC: 32.9 GM/DL — SIGNIFICANT CHANGE UP (ref 32–36)
MCV RBC AUTO: 93.9 FL — SIGNIFICANT CHANGE UP (ref 80–100)
NRBC # BLD: 0 /100 WBCS — SIGNIFICANT CHANGE UP (ref 0–0)
PLATELET # BLD AUTO: 238 K/UL — SIGNIFICANT CHANGE UP (ref 150–400)
RBC # BLD: 3.46 M/UL — LOW (ref 3.8–5.2)
RBC # FLD: 13.5 % — SIGNIFICANT CHANGE UP (ref 10.3–14.5)
WBC # BLD: 8.75 K/UL — SIGNIFICANT CHANGE UP (ref 3.8–10.5)
WBC # FLD AUTO: 8.75 K/UL — SIGNIFICANT CHANGE UP (ref 3.8–10.5)

## 2020-12-16 PROCEDURE — C1889: CPT

## 2020-12-16 PROCEDURE — 85027 COMPLETE CBC AUTOMATED: CPT

## 2020-12-16 PROCEDURE — 81025 URINE PREGNANCY TEST: CPT

## 2020-12-16 PROCEDURE — 82962 GLUCOSE BLOOD TEST: CPT

## 2020-12-16 PROCEDURE — 88305 TISSUE EXAM BY PATHOLOGIST: CPT

## 2020-12-16 PROCEDURE — C9399: CPT

## 2020-12-16 PROCEDURE — S2900: CPT

## 2020-12-16 PROCEDURE — C1781: CPT

## 2020-12-16 RX ORDER — ACETAMINOPHEN 500 MG
3 TABLET ORAL
Qty: 0 | Refills: 0 | DISCHARGE
Start: 2020-12-16

## 2020-12-16 RX ORDER — IBUPROFEN 200 MG
1 TABLET ORAL
Qty: 12 | Refills: 0
Start: 2020-12-16

## 2020-12-16 RX ORDER — OXYCODONE HYDROCHLORIDE 5 MG/1
1 TABLET ORAL
Qty: 8 | Refills: 0
Start: 2020-12-16 | End: 2020-12-17

## 2020-12-16 RX ORDER — OXYCODONE HYDROCHLORIDE 5 MG/1
1 TABLET ORAL
Qty: 8 | Refills: 0
Start: 2020-12-16 | End: 2021-01-27

## 2020-12-16 RX ORDER — POLYETHYLENE GLYCOL 3350 17 G/17G
1 POWDER, FOR SOLUTION ORAL
Qty: 0 | Refills: 0 | DISCHARGE

## 2020-12-16 RX ORDER — DOCUSATE SODIUM 100 MG
1 CAPSULE ORAL
Qty: 0 | Refills: 0 | DISCHARGE

## 2020-12-16 RX ORDER — IBUPROFEN 200 MG
1 TABLET ORAL
Qty: 0 | Refills: 0 | DISCHARGE

## 2020-12-16 RX ORDER — IBUPROFEN 200 MG
600 TABLET ORAL EVERY 6 HOURS
Refills: 0 | Status: DISCONTINUED | OUTPATIENT
Start: 2020-12-16 | End: 2020-12-16

## 2020-12-16 RX ADMIN — Medication 600 MILLIGRAM(S): at 12:28

## 2020-12-16 RX ADMIN — Medication 30 MILLIGRAM(S): at 01:09

## 2020-12-16 RX ADMIN — Medication 975 MILLIGRAM(S): at 09:40

## 2020-12-16 RX ADMIN — Medication 30 MILLIGRAM(S): at 05:22

## 2020-12-16 RX ADMIN — Medication 30 MILLIGRAM(S): at 00:39

## 2020-12-16 RX ADMIN — Medication 975 MILLIGRAM(S): at 10:40

## 2020-12-16 RX ADMIN — Medication 600 MILLIGRAM(S): at 11:39

## 2020-12-16 RX ADMIN — SIMETHICONE 80 MILLIGRAM(S): 80 TABLET, CHEWABLE ORAL at 09:40

## 2020-12-16 RX ADMIN — Medication 975 MILLIGRAM(S): at 04:12

## 2020-12-16 NOTE — DISCHARGE NOTE PROVIDER - HOSPITAL COURSE
The patient was admitted for scheduled surgery. Surgery was uncomplicated. Post operatively, the patient was doing well. On POD1, her UOP was adequate, her vitals were stable, and her AM labs were appropriate. She was ambulating, eating, and pain was well controlled. She had a trial of void in the morning and passed. On POD#      She was discharge in a stable condition. On the day of discharge, she was voiding, ambulating, tolerating regular diet without nauea/vomiting, and pain was well controlled on PO medications. She will have close follow up with Dr. Pino.   The patient was admitted for scheduled surgery. Surgery was uncomplicated. Post operatively, the patient was doing well. On POD1, her UOP was adequate, her vitals were stable, and her AM labs were appropriate. She was ambulating, eating, and pain was well controlled. She had a trial of void in the morning and passed. On POD#1 She was discharge in a stable condition. On the day of discharge, she was voiding, ambulating, tolerating regular diet without nauea/vomiting, and pain was well controlled on PO medications. She will have close follow up with Dr. Pino.

## 2020-12-16 NOTE — PROGRESS NOTE ADULT - SUBJECTIVE AND OBJECTIVE BOX
R3 GYN Progress Note    POD#1   HD#2    Patient seen and examined at bedside.  No acute events overnight. No acute complaints.  Pain well controlled.  Patient is ambulating and tolerating PO  Has not yet passed flatus  Gilbert is still in place w/ adequate UOP.  Denies CP, SOB, N/V, fevers, and chills.    Vital Signs Last 24 Hours  T(C): 36.8 (12-16-20 @ 05:00), Max: 37 (12-16-20 @ 00:00)  HR: 74 (12-16-20 @ 05:00) (68 - 96)  BP: 100/62 (12-16-20 @ 05:00) (100/62 - 134/68)  RR: 18 (12-16-20 @ 05:00) (16 - 19)  SpO2: 98% (12-16-20 @ 05:00) (98% - 100%)    I&O's Summary    15 Dec 2020 07:01  -  16 Dec 2020 06:41  --------------------------------------------------------  IN: 1125 mL / OUT: 850 mL / NET: 275 mL        Physical Exam:  General: NAD  CV: RR, S1, S2, no M/R/G  Lungs: CTA b/l, good air flow b/l   Abdomen: Soft, appropriately-tender, softly distended, tympanic, normoactive bowel sounds, no guarding/rebound  Incision: 5 LSC Port incisions cdi w/ dermabond  : no bleeding on pad  Ext: No pain or swelling     Labs:                        10.7   8.75  )-----------( 238      ( 16 Dec 2020 05:40 )             32.5   baso x      eos x      imm gran x      lymph x      mono x      poly x          MEDICATIONS  (STANDING):  acetaminophen   Tablet .. 975 milliGRAM(s) Oral every 6 hours  ketorolac   Injectable 30 milliGRAM(s) IV Push every 6 hours  lactated ringers. 1000 milliLiter(s) (75 mL/Hr) IV Continuous <Continuous>    MEDICATIONS  (PRN):  ondansetron Injectable 8 milliGRAM(s) IV Push every 8 hours PRN Nausea and/or Vomiting  oxyCODONE    IR 5 milliGRAM(s) Oral every 4 hours PRN Severe Pain (7 - 10)  simethicone 80 milliGRAM(s) Chew every 6 hours PRN Gas

## 2020-12-16 NOTE — PROGRESS NOTE ADULT - ASSESSMENT
A/P: 55y POD#1   s/p RA SIMRAN, BS, L.Oophorectomy, Sacral colpopexy, MUS, anterior repair, cystoscopy. Surgery went well without complications. Patient doing well overnight w/ stable vitals.      A/P: 55y POD#1   s/p RA SIMRAN, BS, Sacral colpopexy, cystoscopy. Surgery went well without complications. Patient doing well overnight w/ stable vitals.

## 2020-12-16 NOTE — DISCHARGE NOTE PROVIDER - NSDCCPTREATMENT_GEN_ALL_CORE_FT
PRINCIPAL PROCEDURE  Procedure: Hysterectomy, supracervical, robot-assisted, with sacrocolpopexy  Findings and Treatment:       SECONDARY PROCEDURE  Procedure: Robot-assisted bilateral salpingectomy  Findings and Treatment:

## 2020-12-16 NOTE — DISCHARGE NOTE PROVIDER - NSDCFUADDINST_GEN_ALL_CORE_FT
Regular diet as tolerated, regular activity as tolerated, no heavy lifting for first two weeks.      Nothing per vagina: no intercourse, tampons or douching.  No submerging.     Call your provider if you experience fevers, chills, worsening abdominal pain, inability to urinate or heavy vaginal bleeding.    Follow up with your provider in 2 weeks if you go home without knowles, in two days if you go home with knowles.

## 2020-12-16 NOTE — CHART NOTE - NSCHARTNOTEFT_GEN_A_CORE
TOV Note    Active Trial of Void performed.   300cc NS instilled into the bladder by gravity.  Gilbert D/C'd  Pt voided  400  cc   Gilbert catheter  to remain out.            Kaley Hernández PA-C

## 2020-12-16 NOTE — DISCHARGE NOTE PROVIDER - NSDCMRMEDTOKEN_GEN_ALL_CORE_FT
acetaminophen 325 mg oral tablet: 3 tab(s) orally every 6 hours  Calcium 600+D oral tablet: 1 tab(s) orally once a day  Colace 50 mg oral capsule: 1 cap(s) orally 2 times a day  ibuprofen 600 mg oral tablet: 1 tab(s) orally every 6 hours  MiraLax: 1 application orally once a day  multivitamin: orally once a day  oxyCODONE 5 mg oral tablet: 1 tab(s) orally every 6 hours, As Needed -Severe Pain (7 - 10) MDD:4

## 2020-12-16 NOTE — DISCHARGE NOTE NURSING/CASE MANAGEMENT/SOCIAL WORK - NSDCPNINST_GEN_ALL_CORE
Follow up with MD as directed. Call if any increased pain, fever, chills, pain on urination, vaginal bleeding, oozing, drainage, and redness at scope sites

## 2020-12-16 NOTE — DISCHARGE NOTE NURSING/CASE MANAGEMENT/SOCIAL WORK - PATIENT PORTAL LINK FT
You can access the FollowMyHealth Patient Portal offered by [facility] by registering at the following website: [email or web address]. By joining [employer]’s FollowMyHealth portal, you will also be able to view your health information using other applications (apps) compatible with our system.

## 2020-12-16 NOTE — DISCHARGE NOTE PROVIDER - NSDCCPCAREPLAN_GEN_ALL_CORE_FT
PRINCIPAL DISCHARGE DIAGNOSIS  Diagnosis: Uterovaginal prolapse  Assessment and Plan of Treatment:

## 2020-12-16 NOTE — DISCHARGE NOTE PROVIDER - CARE PROVIDER_API CALL
Jesus Pino (MD)  Female Pelvic MedReconst Surg; Obstetrics and Gynecology  [address]  [place] [address]  Phone: [phone]  Fax: [phone]  Follow Up Time:

## 2020-12-16 NOTE — PROGRESS NOTE ADULT - PROBLEM SELECTOR PLAN 1
Neuro: Continue Tylenol, Motrin, Oxy for pain  CV: Hemodynamically stable. f/u AM CBC.  Pulm: Saturating well on room air, encourage oob/amb  GI: Continue regular diet  : UOP adequate (675 overnight), Patient to have TOV after AM CBC and breakfast.  Heme: c/w SCDs for DVT ppx  FEN: LR@75. SLIV after TOV  ID: Afebrile  Endo: No active issues   Dispo: Continue routine post-op care. TOV this AM    Darrin Napier PGY-3

## 2020-12-17 ENCOUNTER — NON-APPOINTMENT (OUTPATIENT)
Age: 55
End: 2020-12-17

## 2020-12-17 LAB
BACTERIA UR CULT: NORMAL
BILIRUB UR QL STRIP: NORMAL
CLARITY UR: CLEAR
COLLECTION METHOD: NORMAL
GLUCOSE UR-MCNC: NORMAL
HCG UR QL: 0.2 EU/DL
HGB UR QL STRIP.AUTO: NORMAL
KETONES UR-MCNC: NORMAL
LEUKOCYTE ESTERASE UR QL STRIP: NORMAL
NITRITE UR QL STRIP: NORMAL
PH UR STRIP: 6
PROT UR STRIP-MCNC: NORMAL
SP GR UR STRIP: 1.01

## 2020-12-21 ENCOUNTER — NON-APPOINTMENT (OUTPATIENT)
Age: 55
End: 2020-12-21

## 2020-12-22 ENCOUNTER — NON-APPOINTMENT (OUTPATIENT)
Age: 55
End: 2020-12-22

## 2020-12-22 LAB — SURGICAL PATHOLOGY STUDY: SIGNIFICANT CHANGE UP

## 2020-12-23 ENCOUNTER — APPOINTMENT (OUTPATIENT)
Dept: UROGYNECOLOGY | Facility: CLINIC | Age: 55
End: 2020-12-23
Payer: COMMERCIAL

## 2020-12-23 VITALS
TEMPERATURE: 96.7 F | SYSTOLIC BLOOD PRESSURE: 122 MMHG | DIASTOLIC BLOOD PRESSURE: 78 MMHG | HEART RATE: 73 BPM | OXYGEN SATURATION: 100 %

## 2020-12-23 PROCEDURE — 99024 POSTOP FOLLOW-UP VISIT: CPT | Mod: GC

## 2020-12-28 ENCOUNTER — NON-APPOINTMENT (OUTPATIENT)
Age: 55
End: 2020-12-28

## 2020-12-28 NOTE — SUBJECTIVE
[FreeTextEntry1] : Dizziness resolved after increasing fluid intake and eating small frequent meals  [FreeTextEntry8] : None [FreeTextEntry7] : None [FreeTextEntry6] : Improving. Tolerating regular diet without nausea/vomiting  [FreeTextEntry5] : Normal, no dysuria, incontinence, incomplete emptying.  [FreeTextEntry4] : Normal [FreeTextEntry3] : Normal [FreeTextEntry2] : Normal

## 2020-12-28 NOTE — DISCUSSION/SUMMARY
[FreeTextEntry1] : \daksha Segal is a 54yo s/p RASCH, bilateral salpingectomy, SCP, cystoscopy on 12/15/20. She is doing well, meeting postop milestones. Postop instructions reviewed. Patient will fax lab results from her PCP. RTO in 4 weeks.

## 2020-12-28 NOTE — OBJECTIVE
[Soft and Nontender] : soft and nontender [Clean, Dry, Intact] : Clean, Dry, Intact [Good Support] : Good support [Healing well] : healing well [No Masses or Tenderness] : no masses or tenderness [Post Void Residual ____ ml] : Post Void Residual was [unfilled] ml [FreeTextEntry3] : No mesh exposure or suture

## 2020-12-29 ENCOUNTER — NON-APPOINTMENT (OUTPATIENT)
Age: 55
End: 2020-12-29

## 2021-01-06 ENCOUNTER — NON-APPOINTMENT (OUTPATIENT)
Age: 56
End: 2021-01-06

## 2021-01-12 ENCOUNTER — NON-APPOINTMENT (OUTPATIENT)
Age: 56
End: 2021-01-12

## 2021-01-20 ENCOUNTER — APPOINTMENT (OUTPATIENT)
Dept: UROGYNECOLOGY | Facility: CLINIC | Age: 56
End: 2021-01-20
Payer: COMMERCIAL

## 2021-01-20 VITALS — DIASTOLIC BLOOD PRESSURE: 79 MMHG | SYSTOLIC BLOOD PRESSURE: 117 MMHG | HEART RATE: 76 BPM

## 2021-01-20 DIAGNOSIS — Z98.890 OTHER SPECIFIED POSTPROCEDURAL STATES: ICD-10-CM

## 2021-01-20 PROCEDURE — 99024 POSTOP FOLLOW-UP VISIT: CPT

## 2021-01-20 NOTE — SUBJECTIVE
[FreeTextEntry1] : Doing much better.  Occasional pelvic pressure but getting better [FreeTextEntry8] : none [FreeTextEntry7] : denies any pain or discomfort except when she presses midchest, epigastric area.  She was advised to stop pressing there. [FreeTextEntry6] : good [FreeTextEntry5] : Denies any DORETHA or UUI [FreeTextEntry4] : constipation as she have not been taking any BM regimen.  She was advised to continue on BM regimen [FreeTextEntry3] : normal and steady [FreeTextEntry2] : good

## 2021-01-20 NOTE — DISCUSSION/SUMMARY
[Post-Op instructions given. Pt/family verbalizes understanding] : post-operative instructions were provided to the patient/family who verbalize understanding [Risks/Benefits discussed. Pt/family verbalizes understanding] : risks and benefits of the procedure were discussed with the patient/family who verbalize understanding [FreeTextEntry1] : PT may use Biotin oil for her incision area.  She may slowly resume her normal activities.\par Follow up in 1 month for post op.  IF pt have any problems/concern to call office.  PT aware and agrees.

## 2021-01-20 NOTE — OBJECTIVE
[Soft and Nontender] : soft and nontender [Clean, Dry, Intact] : Clean, Dry, Intact [Good Support] : Good support [Healing well] : healing well [No Masses or Tenderness] : no masses or tenderness [FreeTextEntry3] : No mesh exposure noted

## 2021-01-25 ENCOUNTER — NON-APPOINTMENT (OUTPATIENT)
Age: 56
End: 2021-01-25

## 2021-02-14 ENCOUNTER — NON-APPOINTMENT (OUTPATIENT)
Age: 56
End: 2021-02-14

## 2021-02-19 ENCOUNTER — NON-APPOINTMENT (OUTPATIENT)
Age: 56
End: 2021-02-19

## 2021-02-20 ENCOUNTER — EMERGENCY (EMERGENCY)
Facility: HOSPITAL | Age: 56
LOS: 1 days | Discharge: HOME | End: 2021-02-20
Attending: EMERGENCY MEDICINE
Payer: COMMERCIAL

## 2021-02-20 VITALS
HEART RATE: 102 BPM | TEMPERATURE: 100 F | RESPIRATION RATE: 16 BRPM | DIASTOLIC BLOOD PRESSURE: 52 MMHG | SYSTOLIC BLOOD PRESSURE: 117 MMHG | HEIGHT: 65 IN | OXYGEN SATURATION: 98 % | WEIGHT: 149.91 LBS

## 2021-02-20 DIAGNOSIS — Z98.890 OTHER SPECIFIED POSTPROCEDURAL STATES: Chronic | ICD-10-CM

## 2021-02-20 DIAGNOSIS — Z90.89 ACQUIRED ABSENCE OF OTHER ORGANS: Chronic | ICD-10-CM

## 2021-02-20 LAB
ALBUMIN SERPL ELPH-MCNC: 4.1 G/DL — SIGNIFICANT CHANGE UP (ref 3.3–5)
ALP SERPL-CCNC: 85 U/L — SIGNIFICANT CHANGE UP (ref 40–120)
ALT FLD-CCNC: 18 U/L — SIGNIFICANT CHANGE UP (ref 10–45)
ANION GAP SERPL CALC-SCNC: 10 MMOL/L — SIGNIFICANT CHANGE UP (ref 5–17)
APPEARANCE UR: CLEAR — SIGNIFICANT CHANGE UP
AST SERPL-CCNC: 24 U/L — SIGNIFICANT CHANGE UP (ref 10–40)
BACTERIA # UR AUTO: NEGATIVE — SIGNIFICANT CHANGE UP
BASOPHILS # BLD AUTO: 0 K/UL — SIGNIFICANT CHANGE UP (ref 0–0.2)
BASOPHILS NFR BLD AUTO: 0 % — SIGNIFICANT CHANGE UP (ref 0–2)
BILIRUB SERPL-MCNC: 0.1 MG/DL — LOW (ref 0.2–1.2)
BILIRUB UR-MCNC: NEGATIVE — SIGNIFICANT CHANGE UP
BUN SERPL-MCNC: 10 MG/DL — SIGNIFICANT CHANGE UP (ref 7–23)
CALCIUM SERPL-MCNC: 9.1 MG/DL — SIGNIFICANT CHANGE UP (ref 8.4–10.5)
CHLORIDE SERPL-SCNC: 101 MMOL/L — SIGNIFICANT CHANGE UP (ref 96–108)
CO2 SERPL-SCNC: 25 MMOL/L — SIGNIFICANT CHANGE UP (ref 22–31)
COLOR SPEC: COLORLESS — SIGNIFICANT CHANGE UP
CREAT SERPL-MCNC: 0.82 MG/DL — SIGNIFICANT CHANGE UP (ref 0.5–1.3)
DIFF PNL FLD: ABNORMAL
EOSINOPHIL # BLD AUTO: 0.07 K/UL — SIGNIFICANT CHANGE UP (ref 0–0.5)
EOSINOPHIL NFR BLD AUTO: 1.8 % — SIGNIFICANT CHANGE UP (ref 0–6)
EPI CELLS # UR: 9 /HPF — HIGH
GLUCOSE SERPL-MCNC: 105 MG/DL — HIGH (ref 70–99)
GLUCOSE UR QL: NEGATIVE — SIGNIFICANT CHANGE UP
HCT VFR BLD CALC: 37.8 % — SIGNIFICANT CHANGE UP (ref 34.5–45)
HGB BLD-MCNC: 12.8 G/DL — SIGNIFICANT CHANGE UP (ref 11.5–15.5)
HYALINE CASTS # UR AUTO: 1 /LPF — SIGNIFICANT CHANGE UP (ref 0–2)
KETONES UR-MCNC: NEGATIVE — SIGNIFICANT CHANGE UP
LEUKOCYTE ESTERASE UR-ACNC: ABNORMAL
LYMPHOCYTES # BLD AUTO: 0.23 K/UL — LOW (ref 1–3.3)
LYMPHOCYTES # BLD AUTO: 6.2 % — LOW (ref 13–44)
MANUAL SMEAR VERIFICATION: SIGNIFICANT CHANGE UP
MCHC RBC-ENTMCNC: 31.6 PG — SIGNIFICANT CHANGE UP (ref 27–34)
MCHC RBC-ENTMCNC: 33.9 GM/DL — SIGNIFICANT CHANGE UP (ref 32–36)
MCV RBC AUTO: 93.3 FL — SIGNIFICANT CHANGE UP (ref 80–100)
MONOCYTES # BLD AUTO: 0.2 K/UL — SIGNIFICANT CHANGE UP (ref 0–0.9)
MONOCYTES NFR BLD AUTO: 5.3 % — SIGNIFICANT CHANGE UP (ref 2–14)
NEUTROPHILS # BLD AUTO: 3.12 K/UL — SIGNIFICANT CHANGE UP (ref 1.8–7.4)
NEUTROPHILS NFR BLD AUTO: 78.6 % — HIGH (ref 43–77)
NEUTS BAND # BLD: 3.6 % — SIGNIFICANT CHANGE UP (ref 0–8)
NITRITE UR-MCNC: NEGATIVE — SIGNIFICANT CHANGE UP
PH UR: 6.5 — SIGNIFICANT CHANGE UP (ref 5–8)
PLAT MORPH BLD: NORMAL — SIGNIFICANT CHANGE UP
PLATELET # BLD AUTO: 153 K/UL — SIGNIFICANT CHANGE UP (ref 150–400)
POTASSIUM SERPL-MCNC: 4 MMOL/L — SIGNIFICANT CHANGE UP (ref 3.5–5.3)
POTASSIUM SERPL-SCNC: 4 MMOL/L — SIGNIFICANT CHANGE UP (ref 3.5–5.3)
PROT SERPL-MCNC: 7.4 G/DL — SIGNIFICANT CHANGE UP (ref 6–8.3)
PROT UR-MCNC: NEGATIVE — SIGNIFICANT CHANGE UP
RAPID RVP RESULT: SIGNIFICANT CHANGE UP
RBC # BLD: 4.05 M/UL — SIGNIFICANT CHANGE UP (ref 3.8–5.2)
RBC # FLD: 13.2 % — SIGNIFICANT CHANGE UP (ref 10.3–14.5)
RBC BLD AUTO: SIGNIFICANT CHANGE UP
RBC CASTS # UR COMP ASSIST: 1 /HPF — SIGNIFICANT CHANGE UP (ref 0–4)
SARS-COV-2 RNA SPEC QL NAA+PROBE: SIGNIFICANT CHANGE UP
SODIUM SERPL-SCNC: 136 MMOL/L — SIGNIFICANT CHANGE UP (ref 135–145)
SP GR SPEC: 1 — LOW (ref 1.01–1.02)
UROBILINOGEN FLD QL: NEGATIVE — SIGNIFICANT CHANGE UP
VARIANT LYMPHS # BLD: 4.5 % — SIGNIFICANT CHANGE UP (ref 0–6)
WBC # BLD: 3.79 K/UL — LOW (ref 3.8–10.5)
WBC # FLD AUTO: 3.79 K/UL — LOW (ref 3.8–10.5)
WBC UR QL: 23 /HPF — HIGH (ref 0–5)

## 2021-02-20 PROCEDURE — G1004: CPT

## 2021-02-20 PROCEDURE — 74177 CT ABD & PELVIS W/CONTRAST: CPT | Mod: 26,MG

## 2021-02-20 PROCEDURE — 99285 EMERGENCY DEPT VISIT HI MDM: CPT

## 2021-02-20 RX ORDER — ACETAMINOPHEN 500 MG
650 TABLET ORAL ONCE
Refills: 0 | Status: COMPLETED | OUTPATIENT
Start: 2021-02-20 | End: 2021-02-20

## 2021-02-20 RX ADMIN — Medication 650 MILLIGRAM(S): at 20:09

## 2021-02-20 NOTE — ED PROVIDER NOTE - NSFOLLOWUPINSTRUCTIONS_ED_ALL_ED_FT
You were seen for fever. Your urine suggested a UTI. Your labwork and CT of the abdomen were reassuring.    Please discontinue taking Bactrim. Take Keflex, twice a day, for 7 days.    Follow-up with Dr. Pino this week.    Return immediately for any worsening or concerning symptoms including abdominal pain, drainage, redness, warmth around previous incision sites, persistent fever despite antipyretics, or anything else that concerns you.

## 2021-02-20 NOTE — ED PROVIDER NOTE - OBJECTIVE STATEMENT
2 months s/p laparoscopic TAHBS  concerned that lap site infected occasional pus from surgical site, last time 4 days     ER in [place] prescribed bactrim for infected SS, covid tested 3 days   5 days of bactrim started getting fevers yesterday after taking bactrim 100.1 Home fevers 103 took 2 advils last dose t    -cough, Sob, dysuria, back pain, chest, abdominal pain,    fever with aches    No PMH  No known allergies   No meds   Elective hysterectomy

## 2021-02-20 NOTE — ED PROVIDER NOTE - ATTENDING CONTRIBUTION TO CARE
55F, s/p laparoscopic TAHBS 2 mo ago at [facility], presents with fever. Four days ago, pt seen at ED in [place] for swelling, redness, warmth, pustule noted at surgical site, presumed abscess/site infection. Rx'd bactrim. Yesterday, developed fever, tamx 103. Improved with advil. No abd pain, n/v/d, cough, congestion, rhinorrhea, sore throat, dysuria, hematuria. Bt state after began bactrim presumed infected site "popped" and has since improved.    PE: NAD, NCAT, MMM, Trachea midline, Normal conjunctiva, lungs CTAB, S1/S2 RRR, Normal perfusion, 2+ radial pulses bilat, Abdomen Soft, NTND, Laparoscopic surgical site healing wounds cdi, no warmth, fluctuance, erythema, No rebound/guarding, No LE edema, No deformity of extremities, No rashes,  No focal motor or sensory deficits.     Fever. Exam does not suggest surgical site infection, however given recent reported infection, surgical hx, concern for abscess/deep infection. To obtain ct a/p. No other suggested source of infection, but will check urine, cxr, rvp. May also be medication reaction to bactrim, though no signs of rash or other medication reaction. D/w gyn surgery. Re-eval. - Alejandro Little MD

## 2021-02-20 NOTE — ED ADULT TRIAGE NOTE - CHIEF COMPLAINT QUOTE
fever x2 days- recent travel to [place] 5 days ago  laparoscopic hysterectomy 2 months ago- pus at surgical site 1 week ago; placed on bactrim without improvement

## 2021-02-20 NOTE — ED PROVIDER NOTE - CARE PROVIDER_API CALL
Jesus Pino (MD)  Female Pelvic MedReconst Surg; Obstetrics and Gynecology  [address]  [place] [address]  Phone: [phone]  Fax: [phone]  Follow Up Time:

## 2021-02-20 NOTE — ED PROVIDER NOTE - PROGRESS NOTE DETAILS
CT reveals no collection. Urine with no bacteria but suggests possible UTI. Pt re-evaluated, feels well. CXR was ordered, however pt does not wish to have this performed, pt with lungs ctab, no sx pneumonia, will defer at this time. D/w Dr. Marte, GYN fellow. Plan to d/c with Keflex for possible UTI, they will f/u with patient this week. Plan, return precautions d/w patient. An opportunity to ask questions was provided and all answered.  - Alejandro Little MD

## 2021-02-20 NOTE — ED PROVIDER NOTE - PATIENT PORTAL LINK FT
You can access the FollowMyHealth Patient Portal offered by [facility] by registering at the following website: [email or web address]. By joining [employer]’s FollowMyHealth portal, you will also be able to view your health information using other applications (apps) compatible with our system.

## 2021-02-20 NOTE — ED PROVIDER NOTE - NS ED ROS FT
Constitutional: Fever  Eyes: No visual changes  HEENT: No throat pain  CV: No chest pain  Resp: No SOB no cough  GI: No abd pain, nausea or vomiting  : No dysuria  MSK: No musculoskeletal pain  Skin: No rash  Neuro: No headache

## 2021-02-20 NOTE — ED PROVIDER NOTE - CARE PLAN
Principal Discharge DX:	Febrile illness, acute  Secondary Diagnosis:	UTI (urinary tract infection)

## 2021-02-20 NOTE — CHART NOTE - NSCHARTNOTEFT_GEN_A_CORE
Urogyn Fellow Note    Patient seen and evaluated. She reports that she feels well. She denies abdominal pain, further drainage from her incision sites or tenderness. She states that she did not have any fevers while she was in [place]. She was in [place] visiting her son for one week and returned on 2/17/2021. She reports fever started yesterday 2/19/2021 in the evening after our phone conversation. Patient reports that she has had bowel movements, hard stool. No diarrhea. Denies chest pain, dyspnea, dysuria, vaginal discharge.     Temp: 100.4F,     Exam:   General: No acute distress.   Abdomen: Soft, nontender, nondistended. Incision sites - clean/dry/intact. Scab over the far left incision which was removed and revealed a clean, nondraining incision. All appropriately healing.   Pelvic: No vaginal discharge, no mesh exposure.     No leukocytosis.   UCx: +leuks, negative nitrites    Assessment:   Ms. Persaud is 56 yo s/p RA-SIMRAN, BS, SCP, cystoscopy on 12/15/2020.     Plan  -CT A/P with IV contrast ordered  -COVID test performed    Will follow up on results.   Spoke with GYN team.     SUDHEER Marte MD PGY5   D/w Dr. Valladares Urogyn Fellow Note    Patient seen and evaluated. She reports that she feels well. She denies abdominal pain, further drainage from her incision sites or tenderness. She states that she did not have any fevers while she was in [place]. She was in [place] visiting her son for one week and returned on 2/17/2021. She reports fever started yesterday 2/19/2021 in the evening after our phone conversation. Patient reports that she has had bowel movements, hard stool. No diarrhea. Denies chest pain, dyspnea, dysuria, vaginal discharge. Last dose of Bactrim was this morning.    Temp: 100.4F,     Exam:   General: No acute distress.   Abdomen: Soft, nontender, nondistended. Incision sites - clean/dry/intact. Scab over the far left incision which was removed and revealed a clean, nondraining incision. All appropriately healing.   Pelvic: No vaginal discharge, no mesh exposure.     No leukocytosis.   UCx: +leuks, negative nitrites, negative bacteria     Assessment:   Ms. Persaud is 54 yo s/p RA-SIMRAN, BS, SCP, cystoscopy on 12/15/2020.     Plan  -CT A/P with IV contrast ordered  -COVID test performed    Will follow up on results.   Spoke with GYN team.     SUDHEER Marte MD PGY5   D/w Dr. Valladares

## 2021-02-21 VITALS
OXYGEN SATURATION: 100 % | TEMPERATURE: 100 F | SYSTOLIC BLOOD PRESSURE: 115 MMHG | DIASTOLIC BLOOD PRESSURE: 58 MMHG | HEART RATE: 99 BPM | RESPIRATION RATE: 17 BRPM

## 2021-02-21 PROCEDURE — 81001 URINALYSIS AUTO W/SCOPE: CPT

## 2021-02-21 PROCEDURE — 87086 URINE CULTURE/COLONY COUNT: CPT

## 2021-02-21 PROCEDURE — 74177 CT ABD & PELVIS W/CONTRAST: CPT

## 2021-02-21 PROCEDURE — 36000 PLACE NEEDLE IN VEIN: CPT | Mod: XU

## 2021-02-21 PROCEDURE — 85025 COMPLETE CBC W/AUTO DIFF WBC: CPT

## 2021-02-21 PROCEDURE — 99284 EMERGENCY DEPT VISIT MOD MDM: CPT | Mod: 25

## 2021-02-21 PROCEDURE — 0225U NFCT DS DNA&RNA 21 SARSCOV2: CPT

## 2021-02-21 PROCEDURE — 80053 COMPREHEN METABOLIC PANEL: CPT

## 2021-02-21 RX ORDER — ACETAMINOPHEN 500 MG
650 TABLET ORAL ONCE
Refills: 0 | Status: COMPLETED | OUTPATIENT
Start: 2021-02-21 | End: 2021-02-21

## 2021-02-21 RX ORDER — CEPHALEXIN 500 MG
500 CAPSULE ORAL ONCE
Refills: 0 | Status: COMPLETED | OUTPATIENT
Start: 2021-02-21 | End: 2021-02-21

## 2021-02-21 RX ORDER — CEPHALEXIN 500 MG
1 CAPSULE ORAL
Qty: 14 | Refills: 0
Start: 2021-02-21 | End: 2021-02-27

## 2021-02-21 RX ADMIN — Medication 650 MILLIGRAM(S): at 01:30

## 2021-02-21 RX ADMIN — Medication 500 MILLIGRAM(S): at 01:30

## 2021-02-22 ENCOUNTER — NON-APPOINTMENT (OUTPATIENT)
Age: 56
End: 2021-02-22

## 2021-02-22 LAB
CULTURE RESULTS: SIGNIFICANT CHANGE UP
SPECIMEN SOURCE: SIGNIFICANT CHANGE UP

## 2021-02-26 ENCOUNTER — NON-APPOINTMENT (OUTPATIENT)
Age: 56
End: 2021-02-26

## 2021-03-03 ENCOUNTER — APPOINTMENT (OUTPATIENT)
Dept: UROGYNECOLOGY | Facility: CLINIC | Age: 56
End: 2021-03-03
Payer: COMMERCIAL

## 2021-03-03 VITALS — SYSTOLIC BLOOD PRESSURE: 111 MMHG | TEMPERATURE: 97.1 F | HEART RATE: 72 BPM | DIASTOLIC BLOOD PRESSURE: 72 MMHG

## 2021-03-03 DIAGNOSIS — N81.11 CYSTOCELE, MIDLINE: ICD-10-CM

## 2021-03-03 DIAGNOSIS — N81.2 INCOMPLETE UTEROVAGINAL PROLAPSE: ICD-10-CM

## 2021-03-03 DIAGNOSIS — N81.6 RECTOCELE: ICD-10-CM

## 2021-03-03 PROCEDURE — 99024 POSTOP FOLLOW-UP VISIT: CPT

## 2021-03-03 NOTE — DISCUSSION/SUMMARY
[FreeTextEntry1] : PT may resume back to her normal activities.\par She may continue to use Vitamin E or apply Biotin oil to her abdomen.\par Follow up in 9 month for the 1 year visit post surgery. \par If pt have any problems/concern to call office.  PT aware and agrees.

## 2021-03-03 NOTE — HISTORY OF PRESENT ILLNESS
[FreeTextEntry1] : [place], 56y/o female RTO for follow up on s/p 12/15/2021 RA-SIMRAN, BS, SCP, cysto.\par Pt have been doing fine.  She was in [place] and had a fever of 103 and went to the ER.  Had CT with contrast and thinks she developed a rash a few days later.  She also thinks she was giving Bactrim and it caused her to have a fever.  She was stopped the abx.  She's been doing fine.\par The incisions along her abdomen is healing.  She applies Vitamin E to the area to help with healing and scars.  She is urinating and moving BM.  She had occasional BM issues and was taking Miralax which she stopped and only eats a banana, a cup of coffee and some water to help move her BM.\par PT haven't resume her normal activities yet.

## 2021-03-03 NOTE — PHYSICAL EXAM
[No Acute Distress] : in no acute distress [Well developed] : well developed [Well Nourished] : ~L well nourished [Good Hygeine] : demonstrates good hygeine [Oriented x3] : oriented to person, place, and time [Normal Memory] : ~T memory was ~L unimpaired [Normal Mood/Affect] : mood and affect are normal [Soft, Nontender] : the abdomen was soft and nontender [Warm and Dry] : was warm and dry to touch [Normal Gait] : gait was normal [Normal] : normal external genitalia [Labia Majora] : were normal [Normal Appearance] : general appearance was normal [de-identified] : No mesh exposure noted.

## 2021-03-12 NOTE — ED ADULT NURSE NOTE - NS ED NOTE  TALK SOMEONE YN
Dee Dee,     Your blood pressure looks good.     I am glad you are doing well with the Duloxetine and Alprazolam.     Continue your current medications.     Please see me in 1 year for a physical.   
No

## 2021-05-25 ENCOUNTER — NON-APPOINTMENT (OUTPATIENT)
Age: 56
End: 2021-05-25

## 2022-04-12 NOTE — ED ADULT TRIAGE NOTE - TEMPERATURE IN FAHRENHEIT (DEGREES F)
Pt called endoscopy with question in regards to her pouchoscopy from last year. Left her a VM and requested a call back so I can answer her questions.     Pt states that she still has a retained suture from her takedown. Set up follow up appt with     100.4

## 2022-04-18 NOTE — PRE-OP CHECKLIST - VERIFY SURGICAL SITE/SIDE WITH PATIENT
Patient c/o mid chest pain 7 of 10 sore and radiates to left shoulder .  BP is 146/86. Repositioned pillow. Patient now up to the bathroom.. procedure site is [facility].  1105 repositioned patient back to bed.   
done

## 2022-06-22 NOTE — PRE-OP CHECKLIST - AS BP NONINV METHOD
Patient back from CT. Denies any needs at this time.   Severe aortic stenosis electronic COPD (chronic obstructive pulmonary disease)

## 2022-07-15 NOTE — PRE-OP CHECKLIST - LAST TOOK
solids Oral Minoxidil Pregnancy And Lactation Text: This medication should only be used when clearly needed if you are pregnant, attempting to become pregnant or breast feeding.

## 2024-02-28 NOTE — PRE-OP CHECKLIST - TEMPERATURE IN FAHRENHEIT (DEGREES F)
98.4 Detail Level: Detailed Spray Paint Text: The liquid nitrogen was applied to the skin utilizing a spray paint frosting technique. Consent: The patient's consent was obtained including but not limited to risks of crusting, scabbing, blistering, scarring, darker or lighter pigmentary change, recurrence, incomplete removal and infection. The patient understands that the procedure is cosmetic in nature and is not covered by insurance. Price (Use Numbers Only, No Special Characters Or $): 0 Spray Paint Technique: No Billing Information: Bill by Static Price Show Spray Paint Technique Variable?: Yes Post-Care Instructions: I reviewed with the patient in detail post-care instructions. Patient is to wear sunprotection, and avoid picking at any of the treated lesions. Pt may apply Vaseline to crusted or scabbing areas.

## 2024-09-18 NOTE — PRE-ANESTHESIA EVALUATION ADULT - NSANTHOSAYNRD_GEN_A_CORE
No. ABIGAIL screening performed.  STOP BANG Legend: 0-2 = LOW Risk; 3-4 = INTERMEDIATE Risk; 5-8 = HIGH Risk no